# Patient Record
Sex: MALE | URBAN - METROPOLITAN AREA
[De-identification: names, ages, dates, MRNs, and addresses within clinical notes are randomized per-mention and may not be internally consistent; named-entity substitution may affect disease eponyms.]

---

## 2020-10-20 ENCOUNTER — APPOINTMENT (RX ONLY)
Dept: URBAN - METROPOLITAN AREA CLINIC 352 | Facility: CLINIC | Age: 80
Setting detail: DERMATOLOGY
End: 2020-10-20

## 2020-10-20 DIAGNOSIS — B35.6 TINEA CRURIS: ICD-10-CM

## 2020-10-20 DIAGNOSIS — L29.89 OTHER PRURITUS: ICD-10-CM

## 2020-10-20 DIAGNOSIS — L29.8 OTHER PRURITUS: ICD-10-CM

## 2020-10-20 PROCEDURE — ? ADDITIONAL NOTES

## 2020-10-20 PROCEDURE — ? PRESCRIPTION

## 2020-10-20 PROCEDURE — 99201: CPT

## 2020-10-20 PROCEDURE — ? COUNSELING

## 2020-10-20 RX ORDER — FLUCONAZOLE 150 MG/1
TABLET ORAL
Qty: 1 | Refills: 0 | Status: ERX | COMMUNITY
Start: 2020-10-20

## 2020-10-20 RX ORDER — HYDROCORTISONE 25 MG/G
CREAM TOPICAL
Qty: 1 | Refills: 0 | Status: ERX | COMMUNITY
Start: 2020-10-20

## 2020-10-20 RX ADMIN — HYDROCORTISONE: 25 CREAM TOPICAL at 00:00

## 2020-10-20 RX ADMIN — FLUCONAZOLE: 150 TABLET ORAL at 00:00

## 2020-10-20 NOTE — HPI: ITCHING (GENITALS)
How Did The Itching Occur?: gradual in onset  (over a period of years)
How Severe Is The Itching?: moderate
Additional History: Dr Ladd gave otc cortisone 10 cream, has helped but not enough